# Patient Record
Sex: FEMALE | Race: OTHER | URBAN - METROPOLITAN AREA
[De-identification: names, ages, dates, MRNs, and addresses within clinical notes are randomized per-mention and may not be internally consistent; named-entity substitution may affect disease eponyms.]

---

## 2023-04-28 ENCOUNTER — EMERGENCY (EMERGENCY)
Facility: HOSPITAL | Age: 48
LOS: 0 days | Discharge: HOME | End: 2023-04-28
Attending: EMERGENCY MEDICINE
Payer: COMMERCIAL

## 2023-04-28 VITALS
DIASTOLIC BLOOD PRESSURE: 64 MMHG | RESPIRATION RATE: 14 BRPM | TEMPERATURE: 99 F | WEIGHT: 110.01 LBS | OXYGEN SATURATION: 100 % | SYSTOLIC BLOOD PRESSURE: 122 MMHG | HEART RATE: 87 BPM | HEIGHT: 64 IN

## 2023-04-28 DIAGNOSIS — S61.452A OPEN BITE OF LEFT HAND, INITIAL ENCOUNTER: ICD-10-CM

## 2023-04-28 DIAGNOSIS — S60.511A ABRASION OF RIGHT HAND, INITIAL ENCOUNTER: ICD-10-CM

## 2023-04-28 DIAGNOSIS — Z20.3 CONTACT WITH AND (SUSPECTED) EXPOSURE TO RABIES: ICD-10-CM
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
## 2023-04-28 DIAGNOSIS — Z23 ENCOUNTER FOR IMMUNIZATION: ICD-10-CM
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
## 2023-04-28 PROCEDURE — 90675 RABIES VACCINE IM: CPT

## 2023-04-28 PROCEDURE — 99283 EMERGENCY DEPT VISIT LOW MDM: CPT | Mod: 25

## 2023-04-28 PROCEDURE — 90471 IMMUNIZATION ADMIN: CPT

## 2023-04-28 PROCEDURE — 90715 TDAP VACCINE 7 YRS/> IM: CPT

## 2023-04-28 PROCEDURE — 90472 IMMUNIZATION ADMIN EACH ADD: CPT

## 2023-04-28 PROCEDURE — 90377 RABIES IG HT&SOL HUMAN IM/SC: CPT

## 2023-04-28 PROCEDURE — 99284 EMERGENCY DEPT VISIT MOD MDM: CPT

## 2023-04-28 PROCEDURE — 96372 THER/PROPH/DIAG INJ SC/IM: CPT

## 2023-04-28 RX ORDER — TETANUS TOXOID, REDUCED DIPHTHERIA TOXOID AND ACELLULAR PERTUSSIS VACCINE, ADSORBED 5; 2.5; 8; 8; 2.5 [IU]/.5ML; [IU]/.5ML; UG/.5ML; UG/.5ML; UG/.5ML
0.5 SUSPENSION INTRAMUSCULAR ONCE
Refills: 0 | Status: COMPLETED | OUTPATIENT
Start: 2023-04-28 | End: 2023-04-28

## 2023-04-28 RX ORDER — HUMAN RABIES VIRUS IMMUNE GLOBULIN 150 [IU]/ML
1000 INJECTION, SOLUTION INTRAMUSCULAR ONCE
Refills: 0 | Status: COMPLETED | OUTPATIENT
Start: 2023-04-28 | End: 2023-04-28

## 2023-04-28 RX ORDER — LIDOCAINE HCL 20 MG/ML
2 VIAL (ML) INJECTION ONCE
Refills: 0 | Status: COMPLETED | OUTPATIENT
Start: 2023-04-28 | End: 2023-04-28

## 2023-04-28 RX ORDER — RABIES VACC, HUMAN DIPLOID/PF 2.5 UNIT
1 VIAL (EA) INTRAMUSCULAR ONCE
Refills: 0 | Status: COMPLETED | OUTPATIENT
Start: 2023-04-28 | End: 2023-04-28

## 2023-04-28 RX ADMIN — Medication 1 TABLET(S): at 08:37

## 2023-04-28 RX ADMIN — HUMAN RABIES VIRUS IMMUNE GLOBULIN 1000 UNIT(S): 150 INJECTION, SOLUTION INTRAMUSCULAR at 08:47

## 2023-04-28 RX ADMIN — Medication 1 MILLILITER(S): at 08:48

## 2023-04-28 RX ADMIN — TETANUS TOXOID, REDUCED DIPHTHERIA TOXOID AND ACELLULAR PERTUSSIS VACCINE, ADSORBED 0.5 MILLILITER(S): 5; 2.5; 8; 8; 2.5 SUSPENSION INTRAMUSCULAR at 08:37

## 2023-04-28 RX ADMIN — Medication 2 MILLILITER(S): at 08:56

## 2023-04-28 NOTE — ED PROVIDER NOTE - PATIENT PORTAL LINK FT
You can access the FollowMyHealth Patient Portal offered by [facility] by registering at the following website: [email or web address]. By joining [employer]’s FollowMyHealth portal, you will also be able to view your health information using other applications (apps) compatible with our system.

## 2023-04-28 NOTE — ED PROVIDER NOTE - PHYSICAL EXAMINATION
CONSTITUTIONAL: Well-developed; well-nourished; in no acute distress.   SKIN: Warm, dry  HEAD: Normocephalic; atraumatic  ENT: airway clear.  EXT: Normal ROM. Left thenar eminence puncture wound. Left proximal dorsal thumb puncture wound. No surrounding ecchymosis. Right hand minimal abrasions.   NEURO: Alert, oriented, grossly unremarkable  PSYCH: Cooperative, appropriate.

## 2023-04-28 NOTE — ED PROVIDER NOTE - ATTENDING CONTRIBUTION TO CARE
Patient presenting after stray cat bite and scratch to thenar eminence. patient expressed concern about the abnormal behavior of the cat. Washed out. Rx Abx. Rabies prophylaxis initiated. tdap offered.  Return precautions given.

## 2023-04-28 NOTE — ED PROVIDER NOTE - OBJECTIVE STATEMENT
46 y/o F with no PMH presenting for cat bite to left hand at 5:30am. Pt states she let her dog outside and her dog began to fight with a stray cat; when she broke up fight cat bit her left hand and scratched her right hand. Pt states cat then ran away. C/o pain. Denies numbness, tingling. Not previously vaccinated for Rabies. Last tetanus unknown.

## 2023-04-28 NOTE — ED PROVIDER NOTE - NSFOLLOWUPINSTRUCTIONS_ED_ALL_ED_FT
RETURN ON 5/1, 5/5, 5/12 FOR DOSES 2-4.    TAKE ANTIBIOTICS AS PRESCRIBED.    Animal Bite    Animal bites can range from mild to serious. An animal bite can result in a scratch on the skin, a deep open cut, a puncture of the skin, a crush injury, or tearing away of the skin or a body part. Treatment includes wound care, updating your tetanus shot, and in some cases, administering a rabies vaccine. If you were prescribed an antibiotic, take it as told by your health care provider. Do not stop using the antibiotic even if your condition improves.      SEEK IMMEDIATE MEDICAL CARE IF YOU HAVE ANY OF THE FOLLOWING SYMPTOMS: red streaking away from the wound, fluid/blood/pus coming from the wound, fever or chills, trouble moving the injured area, numbness or tingling extending beyond the wound.

## 2023-05-01 ENCOUNTER — EMERGENCY (EMERGENCY)
Facility: HOSPITAL | Age: 48
LOS: 0 days | Discharge: HOME | End: 2023-05-01
Attending: EMERGENCY MEDICINE
Payer: COMMERCIAL

## 2023-05-01 VITALS
OXYGEN SATURATION: 99 % | HEART RATE: 69 BPM | HEIGHT: 64 IN | TEMPERATURE: 99 F | DIASTOLIC BLOOD PRESSURE: 60 MMHG | SYSTOLIC BLOOD PRESSURE: 130 MMHG | RESPIRATION RATE: 18 BRPM

## 2023-05-01 DIAGNOSIS — S61.452D OPEN BITE OF LEFT HAND, SUBSEQUENT ENCOUNTER: ICD-10-CM
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
## 2023-05-01 DIAGNOSIS — Z20.3 CONTACT WITH AND (SUSPECTED) EXPOSURE TO RABIES: ICD-10-CM

## 2023-05-01 DIAGNOSIS — Z23 ENCOUNTER FOR IMMUNIZATION: ICD-10-CM

## 2023-05-01 PROBLEM — Z78.9 OTHER SPECIFIED HEALTH STATUS: Chronic | Status: ACTIVE | Noted: 2023-04-28

## 2023-05-01 PROCEDURE — 90675 RABIES VACCINE IM: CPT

## 2023-05-01 PROCEDURE — 99284 EMERGENCY DEPT VISIT MOD MDM: CPT

## 2023-05-01 PROCEDURE — 99281 EMR DPT VST MAYX REQ PHY/QHP: CPT | Mod: 25

## 2023-05-01 PROCEDURE — 90471 IMMUNIZATION ADMIN: CPT

## 2023-05-01 RX ORDER — RABIES VACC, HUMAN DIPLOID/PF 2.5 UNIT
1 VIAL (EA) INTRAMUSCULAR ONCE
Refills: 0 | Status: COMPLETED | OUTPATIENT
Start: 2023-05-01 | End: 2023-05-01

## 2023-05-01 RX ADMIN — Medication 1 MILLILITER(S): at 19:19

## 2023-05-01 NOTE — ED PROVIDER NOTE - ATTENDING APP SHARED VISIT CONTRIBUTION OF CARE
47-year-old female no PMH s/p dog bite presents for wound check and second rabies dose, denies signs / sx infection, vss, healing wound w/o signs infection.

## 2023-05-01 NOTE — ED ADULT NURSE NOTE - OBJECTIVE STATEMENT
s/p animal bite (cat) to left hand last monday. returned to er today for 2nd dose of rabies vaccine. bite nona to left hand appears dry, clean. no fever.

## 2023-05-01 NOTE — ED PROVIDER NOTE - PATIENT PORTAL LINK FT
You can access the FollowMyHealth Patient Portal offered by [facility] by registering at the following website: [email or web address]. By joining [employer]’s FollowMyHealth portal, you will also be able to view your health information using other applications (apps) compatible with our system.

## 2023-05-01 NOTE — ED PROVIDER NOTE - NSFOLLOWUPINSTRUCTIONS_ED_ALL_ED_FT
Please follow up with primary care doctor and return to ED for next does of rabies vaccine on days 7 and 14    RABIES VACCINE - General Information     Rabies Vaccine    WHAT YOU NEED TO KNOW:    What is the rabies vaccine? The rabies vaccine is an injection given to help prevent rabies. The rabies virus is spread to humans through the bite of an infected animal. Dogs, bats, skunks, coyotes, raccoons, and foxes are examples of animals that can carry rabies. The rabies vaccine can protect you from being infected with the virus. The vaccine can also prevent you from developing rabies even if you get it after you were bitten by an animal.     When is the vaccine given? Your healthcare provider will tell you how many doses of the vaccine you need. You may need booster shots if you are at high risk for rabies. The following is a common dosing schedule:     Before exposure to the virus, the vaccine is given in 3 doses. The first dose can be given at any time. The second dose is given 7 days after the first. The third dose is given 21 to 28 days after the first. Plan to get all of the doses 3 to 4 weeks before you travel.     After exposure to the virus, the vaccine is given in either 2 or 4 doses:   A person who has not already had the vaccine will usually get 4 doses. The first dose is given immediately. The other doses are given on days 3, 7, and 14 after the exposure. A shot called Rabies Immune Globulin is given at the same time as the first dose. This medicine helps increase your immune system to fight infection.     A person who has already had the vaccine will usually get 2 doses. The first is given immediately, and the second is given on day 3 after the exposure. Rabies Immune Globulin is not given.    Who should get the rabies vaccine?     Anyone who was bitten by an animal that can carry rabies may need the vaccine  Anyone at high risk for rabies, such as people who work with or care for animals or in a rabies laboratory  Anyone who goes into caves where bats live  Anyone who may have had contact with an infected animal  Anyone traveling to another country where rabies is common    Who should not get the rabies vaccine or should wait to get it?     Tell your healthcare provider if you had a severe allergic reaction to a dose of the rabies vaccine in the past, or to other vaccines. If you are getting the vaccine before exposure, do not get another dose. After exposure, you need to get all the doses even if you are at risk for an allergic reaction. Your healthcare provider may need to take extra precautions before you get another dose.    Tell your healthcare provider about all of your allergies. Also tell him if you have a disease that affects your immune system (such as HIV/AIDS) or you have cancer. Tell him if you are taking medicines that affect your immune system or any cancer treatment drug or radiation. Tell him if you are ill. You may need to wait to get the vaccine until you feel better.     What are the risks of the rabies vaccine? You may have a severe allergic reaction. The area where you got the shot may become red, swollen, or painful. You may develop a headache or muscle aches. You may have nausea or pain in your abdomen. You may develop rabies even after you get the vaccine.    Call 911 for any of the following:     Your mouth and throat are swollen.  You are wheezing or have trouble breathing.  You have chest pain or your heart is beating faster than normal for you.  You feel like you are going to faint.    When should I seek immediate care?     Your face is red or swollen.      You have hives that spread over your body.  You feel weak or dizzy.    When should I contact my healthcare provider?     You have increased pain, redness, or swelling around the area where the shot was given.  You have flu-like symptoms.    You have questions or concerns about the rabies vaccine.    CARE AGREEMENT:    You have the right to help plan your care. Learn about your health condition and how it may be treated. Discuss treatment options with your healthcare providers to decide what care you want to receive. You always have the right to refuse treatment.      © Copyright [employer] 2019 All illustrations and images included in CareNotes are the copyrighted property of [employer].D.A[employer]M., [employer] or [employer].

## 2023-05-01 NOTE — ED PROVIDER NOTE - OBJECTIVE STATEMENT
47-year-old female no medical history presenting the ED for second dose of rabies vaccine.  States 3 days ago she was bit by a stray cat on her left hand came to the ED received first dose and is returning for second denies any fevers, chills or other infectious-like symptoms

## 2023-05-01 NOTE — ED PROVIDER NOTE - PHYSICAL EXAMINATION
VITAL SIGNS: I have reviewed nursing notes and confirm.  CONSTITUTIONAL: in no acute distress.  SKIN: Skin exam is warm and dry, no acute rash. healing wound to left hand no signs of infection  CARD: S1, S2 normal; no murmurs, gallops, or rubs. Regular rate and rhythm.  RESP: No wheezes, rales or rhonchi. Speaking in full sentences.   ABD:  soft; non-distended; non-tender

## 2023-05-05 ENCOUNTER — EMERGENCY (EMERGENCY)
Facility: HOSPITAL | Age: 48
LOS: 0 days | Discharge: HOME | End: 2023-05-05
Attending: EMERGENCY MEDICINE
Payer: COMMERCIAL

## 2023-05-05 VITALS
OXYGEN SATURATION: 99 % | SYSTOLIC BLOOD PRESSURE: 108 MMHG | DIASTOLIC BLOOD PRESSURE: 55 MMHG | TEMPERATURE: 98 F | HEART RATE: 66 BPM | RESPIRATION RATE: 18 BRPM

## 2023-05-05 VITALS
SYSTOLIC BLOOD PRESSURE: 99 MMHG | RESPIRATION RATE: 17 BRPM | HEIGHT: 64 IN | TEMPERATURE: 99 F | WEIGHT: 110.01 LBS | HEART RATE: 62 BPM | OXYGEN SATURATION: 99 % | DIASTOLIC BLOOD PRESSURE: 51 MMHG

## 2023-05-05 DIAGNOSIS — Z20.3 CONTACT WITH AND (SUSPECTED) EXPOSURE TO RABIES: ICD-10-CM

## 2023-05-05 DIAGNOSIS — S61.452D OPEN BITE OF LEFT HAND, SUBSEQUENT ENCOUNTER: ICD-10-CM

## 2023-05-05 DIAGNOSIS — Z23 ENCOUNTER FOR IMMUNIZATION: ICD-10-CM

## 2023-05-05 PROCEDURE — 90471 IMMUNIZATION ADMIN: CPT

## 2023-05-05 PROCEDURE — 90675 RABIES VACCINE IM: CPT

## 2023-05-05 PROCEDURE — 99283 EMERGENCY DEPT VISIT LOW MDM: CPT

## 2023-05-05 PROCEDURE — 99281 EMR DPT VST MAYX REQ PHY/QHP: CPT | Mod: 25

## 2023-05-05 RX ORDER — RABIES VACC, HUMAN DIPLOID/PF 2.5 UNIT
1 VIAL (EA) INTRAMUSCULAR ONCE
Refills: 0 | Status: COMPLETED | OUTPATIENT
Start: 2023-05-05 | End: 2023-05-05

## 2023-05-05 RX ADMIN — Medication 1 MILLILITER(S): at 20:27

## 2023-05-05 NOTE — ED PROVIDER NOTE - NSFOLLOWUPINSTRUCTIONS_ED_ALL_ED_FT
RABIES VACCINE - General Information     Rabies Vaccine    WHAT YOU NEED TO KNOW:    What is the rabies vaccine? The rabies vaccine is an injection given to help prevent rabies. The rabies virus is spread to humans through the bite of an infected animal. Dogs, bats, skunks, coyotes, raccoons, and foxes are examples of animals that can carry rabies. The rabies vaccine can protect you from being infected with the virus. The vaccine can also prevent you from developing rabies even if you get it after you were bitten by an animal.     When is the vaccine given? Your healthcare provider will tell you how many doses of the vaccine you need. You may need booster shots if you are at high risk for rabies. The following is a common dosing schedule:     Before exposure to the virus, the vaccine is given in 3 doses. The first dose can be given at any time. The second dose is given 7 days after the first. The third dose is given 21 to 28 days after the first. Plan to get all of the doses 3 to 4 weeks before you travel.     After exposure to the virus, the vaccine is given in either 2 or 4 doses:   A person who has not already had the vaccine will usually get 4 doses. The first dose is given immediately. The other doses are given on days 3, 7, and 14 after the exposure. A shot called Rabies Immune Globulin is given at the same time as the first dose. This medicine helps increase your immune system to fight infection.     A person who has already had the vaccine will usually get 2 doses. The first is given immediately, and the second is given on day 3 after the exposure. Rabies Immune Globulin is not given.    Who should get the rabies vaccine?     Anyone who was bitten by an animal that can carry rabies may need the vaccine  Anyone at high risk for rabies, such as people who work with or care for animals or in a rabies laboratory  Anyone who goes into caves where bats live  Anyone who may have had contact with an infected animal  Anyone traveling to another country where rabies is common    Who should not get the rabies vaccine or should wait to get it?     Tell your healthcare provider if you had a severe allergic reaction to a dose of the rabies vaccine in the past, or to other vaccines. If you are getting the vaccine before exposure, do not get another dose. After exposure, you need to get all the doses even if you are at risk for an allergic reaction. Your healthcare provider may need to take extra precautions before you get another dose.    Tell your healthcare provider about all of your allergies. Also tell him if you have a disease that affects your immune system (such as HIV/AIDS) or you have cancer. Tell him if you are taking medicines that affect your immune system or any cancer treatment drug or radiation. Tell him if you are ill. You may need to wait to get the vaccine until you feel better.     What are the risks of the rabies vaccine? You may have a severe allergic reaction. The area where you got the shot may become red, swollen, or painful. You may develop a headache or muscle aches. You may have nausea or pain in your abdomen. You may develop rabies even after you get the vaccine.    Call 911 for any of the following:     Your mouth and throat are swollen.  You are wheezing or have trouble breathing.  You have chest pain or your heart is beating faster than normal for you.  You feel like you are going to faint.    When should I seek immediate care?     Your face is red or swollen.      You have hives that spread over your body.  You feel weak or dizzy.    When should I contact my healthcare provider?     You have increased pain, redness, or swelling around the area where the shot was given.  You have flu-like symptoms.    You have questions or concerns about the rabies vaccine.    CARE AGREEMENT:    You have the right to help plan your care. Learn about your health condition and how it may be treated. Discuss treatment options with your healthcare providers to decide what care you want to receive. You always have the right to refuse treatment.      © Copyright [employer] 2019 All illustrations and images included in CareNotes are the copyrighted property of [employer] or [employer].

## 2023-05-05 NOTE — ED PROVIDER NOTE - PHYSICAL EXAMINATION
SKIN: Skin exam is warm and dry, no acute rash. left hand CDI well healed abrasion  CARD: S1, S2 normal; no murmurs, gallops, or rubs. Regular rate and rhythm.  RESP: No wheezes, rales or rhonchi. Speaking in full sentences.   ABD:  soft; non-distended; non-tender

## 2023-05-05 NOTE — ED PROVIDER NOTE - OBJECTIVE STATEMENT
Patient is a 47-year-old female no medical history presenting the ED for second dose of rabies vaccine.  States 7 days ago she was bit by a stray cat on her left hand came to the ED received first dose and is returning for second. Otherwise denies any fever, chills, headache, changes in vision, cough, congestion, cp, palpitations, sob, n/v/d, abd pain, constipation, urinary complaints, lower extremity pain/swelling.

## 2023-05-05 NOTE — ED PROVIDER NOTE - PATIENT PORTAL LINK FT
You can access the FollowMyHealth Patient Portal offered by [facility] by registering at the following website: [email or web address]. By joining [employer]’s FollowMyHealth portal, you will also be able to view your health information using other applications (apps) compatible with our system.

## 2023-06-26 NOTE — ED PROVIDER NOTE - CLINICAL SUMMARY MEDICAL DECISION MAKING FREE TEXT BOX
Patent
Patient presenting after stray cat bite and scratch to thenar eminence. patient expressed concern about the abnormal behavior of the cat.   most likely contaminated wound. Washed out. Rx Abx.   Expressed concern about the behavior of the cat. Rabies prophylaxis discussed and initiated.   Discussed need for update of Tetanus. Tdap offered.   Patient to be discharged from ED. Verbal instructions given, including instructions to return to ED immediately for any new, worsening, or concerning symptoms. Patient endorsed understanding. Written discharge instructions additionally given, including follow-up plan.